# Patient Record
Sex: FEMALE | Race: WHITE | Employment: FULL TIME
[De-identification: names, ages, dates, MRNs, and addresses within clinical notes are randomized per-mention and may not be internally consistent; named-entity substitution may affect disease eponyms.]

---

## 2017-10-01 ENCOUNTER — HEALTH MAINTENANCE LETTER (OUTPATIENT)
Age: 27
End: 2017-10-01

## 2019-04-26 ENCOUNTER — OFFICE VISIT (OUTPATIENT)
Dept: OBGYN | Facility: CLINIC | Age: 29
End: 2019-04-26
Payer: COMMERCIAL

## 2019-04-26 VITALS
DIASTOLIC BLOOD PRESSURE: 80 MMHG | WEIGHT: 174.2 LBS | TEMPERATURE: 98 F | SYSTOLIC BLOOD PRESSURE: 123 MMHG | HEIGHT: 67 IN | OXYGEN SATURATION: 100 % | HEART RATE: 68 BPM | BODY MASS INDEX: 27.34 KG/M2

## 2019-04-26 DIAGNOSIS — Z30.011 ENCOUNTER FOR INITIAL PRESCRIPTION OF CONTRACEPTIVE PILLS: ICD-10-CM

## 2019-04-26 DIAGNOSIS — Z01.419 ENCOUNTER FOR GYNECOLOGICAL EXAMINATION WITHOUT ABNORMAL FINDING: Primary | ICD-10-CM

## 2019-04-26 PROCEDURE — 99385 PREV VISIT NEW AGE 18-39: CPT | Performed by: NURSE PRACTITIONER

## 2019-04-26 PROCEDURE — G0145 SCR C/V CYTO,THINLAYER,RESCR: HCPCS | Performed by: NURSE PRACTITIONER

## 2019-04-26 RX ORDER — NORGESTIMATE AND ETHINYL ESTRADIOL 0.25-0.035
1 KIT ORAL DAILY
Qty: 84 TABLET | Refills: 3 | Status: SHIPPED | OUTPATIENT
Start: 2019-04-26 | End: 2020-03-17

## 2019-04-26 ASSESSMENT — MIFFLIN-ST. JEOR: SCORE: 1557.8

## 2019-04-26 ASSESSMENT — PAIN SCALES - GENERAL: PAINLEVEL: NO PAIN (0)

## 2019-04-26 NOTE — PROGRESS NOTES
SUBJECTIVE:   CC: Amie Holt is an 28 year old woman who presents for preventive health visit.     Healthy Habits:     Getting at least 3 servings of Calcium per day:  Yes    Bi-annual eye exam:  Yes    Dental care twice a year:  NO    Sleep apnea or symptoms of sleep apnea:  None    Diet:  Regular (no restrictions)    Frequency of exercise:  4-5 days/week    Duration of exercise:  45-60 minutes    Taking medications regularly:  Yes    Medication side effects:  Not applicable    PHQ-2 Total Score: 0    Additional concerns today:  No      Would like to restart combined oral contraceptive pill.    Today's PHQ-2 Score:   PHQ-2 ( 1999 [employer]) 4/26/2019   Q1: Little interest or pleasure in doing things 0   Q2: Feeling down, depressed or hopeless 0   PHQ-2 Score 0   Q1: Little interest or pleasure in doing things Not at all   Q2: Feeling down, depressed or hopeless Not at all   PHQ-2 Score 0       Abuse: Current or Past(Physical, Sexual or Emotional)- No  Do you feel safe in your environment? Yes    Social History     Tobacco Use     Smoking status: Never Smoker     Smokeless tobacco: Never Used   Substance Use Topics     Alcohol use: No         Alcohol Use 4/26/2019   Prescreen: >3 drinks/day or >7 drinks/week? No   No flowsheet data found.    Reviewed orders with patient.  Reviewed health maintenance and updated orders accordingly - Yes  There is no problem list on file for this patient.    Past Surgical History:   Procedure Laterality Date     MOUTH SURGERY      wisdom teeth extraction       Social History     Tobacco Use     Smoking status: Never Smoker     Smokeless tobacco: Never Used   Substance Use Topics     Alcohol use: No     Family History   Problem Relation Age of Onset     Allergies Mother      Blood Disease Mother         Von Willebrand disease; pt tested & negative     Cardiovascular Paternal Uncle            Mammogram not appropriate for this patient based on age.    Pertinent mammograms are  "reviewed under the imaging tab.  History of abnormal Pap smear: NO - age 21-29 PAP every 3 years recommended  PAP / HPV 4/18/2012   PAP NIL     Reviewed and updated as needed this visit by clinical staff  Tobacco  Allergies  Meds  Med Hx  Surg Hx  Fam Hx  Soc Hx        Reviewed and updated as needed this visit by Provider        Past Medical History:   Diagnosis Date     Allergic rhinitis       Past Surgical History:   Procedure Laterality Date     MOUTH SURGERY      wisdom teeth extraction       Review of Systems  CONSTITUTIONAL: NEGATIVE for fever, chills, change in weight  INTEGUMENTARU/SKIN: NEGATIVE for worrisome rashes, moles or lesions  EYES: NEGATIVE for vision changes or irritation  ENT: NEGATIVE for ear, mouth and throat problems  RESP: NEGATIVE for significant cough or SOB  BREAST: NEGATIVE for masses, tenderness or discharge  CV: NEGATIVE for chest pain, palpitations or peripheral edema  GI: NEGATIVE for nausea, abdominal pain, heartburn, or change in bowel habits  : NEGATIVE for unusual urinary or vaginal symptoms. Periods are regular.  MUSCULOSKELETAL: NEGATIVE for significant arthralgias or myalgia  NEURO: NEGATIVE for weakness, dizziness or paresthesias  PSYCHIATRIC: NEGATIVE for changes in mood or affect     OBJECTIVE:   /80 (BP Location: Right arm, Patient Position: Sitting, Cuff Size: Adult Regular)   Pulse 68   Temp 98  F (36.7  C) (Oral)   Ht 1.702 m (5' 7\")   Wt 79 kg (174 lb 3.2 oz)   LMP 04/20/2019 (Exact Date)   SpO2 100%   BMI 27.28 kg/m    Physical Exam  GENERAL: healthy, alert and no distress  EYES: Eyes grossly normal to inspection, PERRL and conjunctivae and sclerae normal  HENT: ear canals and TM's normal, nose and mouth without ulcers or lesions  NECK: no adenopathy, no asymmetry, masses, or scars and thyroid normal to palpation  RESP: lungs clear to auscultation - no rales, rhonchi or wheezes  BREAST: normal without masses, tenderness or nipple discharge and no " "palpable axillary masses or adenopathy  CV: regular rate and rhythm, normal S1 S2, no S3 or S4, no murmur, click or rub, no peripheral edema and peripheral pulses strong  ABDOMEN: soft, nontender, no hepatosplenomegaly, no masses and bowel sounds normal   (female): normal female external genitalia, normal urethral meatus, vaginal mucosa pink, moist, well rugated, and normal cervix/adnexa/uterus without masses or discharge  MS: no gross musculoskeletal defects noted, no edema  SKIN: no suspicious lesions or rashes  NEURO: Normal strength and tone, mentation intact and speech normal  PSYCH: mentation appears normal, affect normal/bright    ASSESSMENT/PLAN:   1. Encounter for gynecological examination without abnormal finding  - Pap imaged thin layer screen reflex to HPV if ASCUS - recommend age 25 - 29    2. Encounter for initial prescription of contraceptive pills  Discussed options for contraception with patient and she would like to try an oral contraceptive pill. We discussed when to start the pill, taking it at the same time every day, possible side effects she may experience, and use of barrier method to protect against STDs.   - norgestimate-ethinyl estradiol (SPRINTEC 28) 0.25-35 MG-MCG tablet; Take 1 tablet by mouth daily  Dispense: 84 tablet; Refill: 3    COUNSELING:  Reviewed preventive health counseling, as reflected in patient instructions  Special attention given to:        Regular exercise       Healthy diet/nutrition       Contraception       Safe sex practices/STD prevention       HIV screeninx in teen years, 1x in adult years, and at intervals if high risk    BP Readings from Last 1 Encounters:   19 123/80     Estimated body mass index is 27.28 kg/m  as calculated from the following:    Height as of this encounter: 1.702 m (5' 7\").    Weight as of this encounter: 79 kg (174 lb 3.2 oz).           reports that she has never smoked. She has never used smokeless tobacco.      Counseling " Resources:  ATP IV Guidelines  Pooled Cohorts Equation Calculator  Breast Cancer Risk Calculator  FRAX Risk Assessment  ICSI Preventive Guidelines  Dietary Guidelines for Americans, 2010  USDA's MyPlate  ASA Prophylaxis  Lung CA Screening    OLIVIER Aguero CNP  [facility]

## 2019-04-26 NOTE — LETTER
May 1, 2019      Amie Holt  [address] [address]    Dear ,      I am happy to inform you that your recent cervical cancer screening test (PAP smear) was normal.      Preventative screenings such as this help to ensure your health for years to come. You should repeat a pap smear in 3 years, unless otherwise directed.      You will still need to return to the clinic every year for your annual exam and other preventive tests.     If you have additional questions regarding this result, please call our registered nurse, Khushboo at [phone].      Sincerely,      OLIVIER Aguero CNP/rlm

## 2019-04-30 LAB
COPATH REPORT: NORMAL
PAP: NORMAL

## 2019-12-18 ENCOUNTER — E-VISIT (OUTPATIENT)
Dept: OBGYN | Facility: CLINIC | Age: 29
End: 2019-12-18
Payer: COMMERCIAL

## 2019-12-18 DIAGNOSIS — J01.80 ACUTE NON-RECURRENT SINUSITIS OF OTHER SINUS: Primary | ICD-10-CM

## 2019-12-18 PROCEDURE — 99444 ZZC PHYSICIAN ONLINE EVALUATION & MANAGEMENT SERVICE: CPT | Performed by: NURSE PRACTITIONER

## 2019-12-18 RX ORDER — AMOXICILLIN 875 MG
875 TABLET ORAL 2 TIMES DAILY
Qty: 14 TABLET | Refills: 0 | Status: SHIPPED | OUTPATIENT
Start: 2019-12-18 | End: 2021-03-26

## 2019-12-22 ENCOUNTER — OFFICE VISIT (OUTPATIENT)
Dept: URGENT CARE | Facility: URGENT CARE | Age: 29
End: 2019-12-22
Payer: COMMERCIAL

## 2019-12-22 VITALS
HEART RATE: 86 BPM | DIASTOLIC BLOOD PRESSURE: 73 MMHG | BODY MASS INDEX: 26.63 KG/M2 | SYSTOLIC BLOOD PRESSURE: 122 MMHG | TEMPERATURE: 98.6 F | WEIGHT: 170 LBS | OXYGEN SATURATION: 98 %

## 2019-12-22 DIAGNOSIS — H57.02 PUPIL ASYMMETRY: Primary | ICD-10-CM

## 2019-12-22 PROCEDURE — 99214 OFFICE O/P EST MOD 30 MIN: CPT | Performed by: NURSE PRACTITIONER

## 2019-12-22 NOTE — PROGRESS NOTES
SUBJECTIVE:  Chief Complaint:   Chief Complaint   Patient presents with     Eye Problem     left eye pain and swelling      History of Present Illness:  Amie Holt is a 29 year old female who presents complaining of moderate left eye pain, burning, redness for 1 week(s).   Onset/timing: sudden, worsening.    Contact wearer : No   Pupils different sizes, light sensitivity.   No recent head injury  Has had cold symptoms last week but this started before cold.   No injury to head or eye, no new products in eye.     Past Medical History:   Diagnosis Date     Allergic rhinitis      Current Outpatient Medications   Medication Sig Dispense Refill     norgestimate-ethinyl estradiol (SPRINTEC 28) 0.25-35 MG-MCG tablet Take 1 tablet by mouth daily 84 tablet 3     amoxicillin (AMOXIL) 875 MG tablet Take 1 tablet (875 mg) by mouth 2 times daily 14 tablet 0       No Known Allergies    Family History   Problem Relation Age of Onset     Allergies Mother      Blood Disease Mother         Von Willebrand disease; pt tested & negative     Cardiovascular Paternal Uncle        Review Of Systems  Skin: negative  Eyes: positive for left eye redness, different size pupils  Ears/Nose/Throat: negative  Respiratory: No shortness of breath, dyspnea on exertion, cough, or hemoptysis  Cardiovascular: negative  Gastrointestinal: negative  Genitourinary: negative  Musculoskeletal: negative  Neurologic: negative  Psychiatric: negative  Hematologic/Lymphatic/Immunologic: negative  Endocrine: negative      OBJECTIVE:  /73   Pulse 86   Temp 98.6  F (37  C) (Oral)   Wt 77.1 kg (170 lb)   SpO2 98%   Breastfeeding No   BMI 26.63 kg/m    General: no acute distress  Eye exam: right eye normal lid, conjunctiva, cornea, pupil and fundus, left eye abnormal findings: sclera injected. Left eye will react to light is smaller than right  Ears: normal canals, TMs bilaterally, normal TM mobility  Nose: NORMAL - no drainage, turbinates normal in  size.  Neck: supple, non-tender, free range of motion, no adenopathy  Heart: NORMAL - regular rate and rhythm without murmur.  Lungs: normal and clear to auscultation  Skin: No rash  ABDOMEN:  soft, nontender, no HSM or masses and bowel sounds normal  Extremities: no peripheral edema or tenderness, peripheral pulses normal  NEURO: Normal strength and tone, sensory exam grossly normal,  normal speech and mentation  SKIN: no suspicious lesions or rashes  PSYCH: mentation appears normal  LYMPHATICS: no cervical adenopathy        ASSESSMENT:  (H57.02) Pupil asymmetry  (primary encounter diagnosis)      PLAN:  Advised to be seen in ER for more urgent evaluation  I am concerned about pupils being different sizes  The eye does not appear to be abrasion, conjunctivitis?  Will be driven to [facility] now.       OLIVIER Mann CNP

## 2019-12-22 NOTE — NURSING NOTE
VISION   Wears glasses: NOT worn for testing  Tool used: HOTV   Right eye:        10/16 (20/32)   Left eye:          10/20 (20/40)  Both eyes: 10/16 (20/32)    Jolanta Lamb CMA

## 2019-12-26 ENCOUNTER — OFFICE VISIT (OUTPATIENT)
Dept: OPHTHALMOLOGY | Facility: CLINIC | Age: 29
End: 2019-12-26
Payer: COMMERCIAL

## 2019-12-26 DIAGNOSIS — H20.9 UVEITIS OF LEFT EYE: Primary | ICD-10-CM

## 2019-12-26 PROCEDURE — 92002 INTRM OPH EXAM NEW PATIENT: CPT | Performed by: OPHTHALMOLOGY

## 2019-12-26 RX ORDER — TOBRAMYCIN AND DEXAMETHASONE 3; 1 MG/ML; MG/ML
1 SUSPENSION/ DROPS OPHTHALMIC EVERY 6 HOURS
COMMUNITY
Start: 2019-12-25 | End: 2019-12-31

## 2019-12-26 RX ORDER — TOBRAMYCIN AND DEXAMETHASONE 3; 1 MG/ML; MG/ML
1 SUSPENSION/ DROPS OPHTHALMIC
Qty: 1 BOTTLE | Refills: 0 | Status: SHIPPED | OUTPATIENT
Start: 2019-12-26 | End: 2019-12-26

## 2019-12-26 RX ORDER — PREDNISOLONE ACETATE 10 MG/ML
1 SUSPENSION/ DROPS OPHTHALMIC
Qty: 1 BOTTLE | Refills: 1 | Status: SHIPPED | OUTPATIENT
Start: 2019-12-26 | End: 2021-03-26

## 2019-12-26 ASSESSMENT — SLIT LAMP EXAM - LIDS
COMMENTS: NORMAL
COMMENTS: 1+ EDEMA

## 2019-12-26 ASSESSMENT — VISUAL ACUITY
OD_CC: 20/20
CORRECTION_TYPE: GLASSES
METHOD: SNELLEN - LINEAR
OS_CC: 20/20

## 2019-12-26 ASSESSMENT — TONOMETRY
OD_IOP_MMHG: 14
IOP_METHOD: ICARE
OS_IOP_MMHG: 15

## 2019-12-26 ASSESSMENT — EXTERNAL EXAM - LEFT EYE: OS_EXAM: NORMAL

## 2019-12-26 ASSESSMENT — EXTERNAL EXAM - RIGHT EYE: OD_EXAM: NORMAL

## 2019-12-26 NOTE — PROGRESS NOTES
Current Eye Medications:  Started taking tobramycin-dexAMETHasone (TOBRADEX) 0.3%-0.1% yesterday evening every 6 hours left eye while a wake.  Was Taking Polytrim every 4 hours, started 12/22/19 didn't seem to help.      Subjective:  Referred from ER for possible Uveitis of left eye. Red about 1.5 weeks ago. Saturday night aching pain started left eye, would come and go. Started feeling better since starting Tobradex. Not having any pain or discomfort currently  Vision has been OK both eyes.    First episode; no known systemic disease.     Objective:  See Ophthalmology Exam.       Assessment:  Iritis left eye.      Plan:  Continue Tobradex drops left eye every 2 hours while awake until bottle is gone,  Then start Prednisolone left eye every 2 hours while awake.   Start Homatropine left eye twice daily.  Return visit in 1 week for MD check.     Rodney Elaine M.D.  [phone]

## 2019-12-26 NOTE — LETTER
12/26/2019         RE: Amie Holt  [address] [facility] [place] [address]        Dear Colleague,    Thank you for referring your patient, Amie Holt, to the [facility]. Please see a copy of my visit note below.     Current Eye Medications:  Started taking tobramycin-dexAMETHasone (TOBRADEX) 0.3%-0.1% yesterday evening every 6 hours left eye while a wake.  Was Taking Polytrim every 4 hours, started 12/22/19 didn't seem to help.      Subjective:  Referred from ER for possible Uveitis of left eye. Red about 1.5 weeks ago. Saturday night aching pain started left eye, would come and go. Started feeling better since starting Tobradex. Not having any pain or discomfort currently  Vision has been OK both eyes.    First episode; no known systemic disease.     Objective:  See Ophthalmology Exam.       Assessment:  Iritis left eye.      Plan:  Continue Tobradex drops left eye every 2 hours while awake until bottle is gone,  Then start Prednisolone left eye every 2 hours while awake.   Start Homatropine left eye twice daily.  Return visit in 1 week for MD check.     Rodney Elaine M.D.  [phone]           Again, thank you for allowing me to participate in the care of your patient.        Sincerely,        Rodney Elaine MD

## 2019-12-26 NOTE — PATIENT INSTRUCTIONS
Continue Tobradex drops left eye every 2 hours while awake until bottle is gone,  Then start Prednisolone left eye every 2 hours while awake.   Start Homatropine left eye twice daily.  Return visit in 1 week for MD check.     Rodney Elaine M.D.  [phone]

## 2019-12-31 ENCOUNTER — OFFICE VISIT (OUTPATIENT)
Dept: OPHTHALMOLOGY | Facility: CLINIC | Age: 29
End: 2019-12-31
Payer: COMMERCIAL

## 2019-12-31 DIAGNOSIS — H20.9 UVEITIS OF LEFT EYE: Primary | ICD-10-CM

## 2019-12-31 PROCEDURE — 92012 INTRM OPH EXAM EST PATIENT: CPT | Performed by: OPHTHALMOLOGY

## 2019-12-31 ASSESSMENT — TONOMETRY
OS_IOP_MMHG: 21
IOP_METHOD: ICARE
OD_IOP_MMHG: 20

## 2019-12-31 ASSESSMENT — VISUAL ACUITY
OD_CC: 20/20
METHOD: SNELLEN - LINEAR
CORRECTION_TYPE: GLASSES
OS_CC: 20/25SLOW

## 2019-12-31 ASSESSMENT — SLIT LAMP EXAM - LIDS
COMMENTS: NORMAL
COMMENTS: NORMAL

## 2019-12-31 ASSESSMENT — EXTERNAL EXAM - RIGHT EYE: OD_EXAM: NORMAL

## 2019-12-31 ASSESSMENT — EXTERNAL EXAM - LEFT EYE: OS_EXAM: NORMAL

## 2019-12-31 NOTE — PATIENT INSTRUCTIONS
Discontinue Tobradex drop - but hang onto bottle.  Start Prednisolone left eye four times daily for 1 week, three times daily for 1 week, twice daily for 1 week, once daily for 1 week, then stop.  Return visit if symptoms worsen or fail to improve.     Rodney Elaine M.D.  [phone]

## 2019-12-31 NOTE — LETTER
12/31/2019         RE: Amie Holt  [address] [facility] [address]        Dear Colleague,    Thank you for referring your patient, Amie Holt, to the [facility]. Please see a copy of my visit note below.     Current Eye Medications:  Tobradex every 2 hours left eye. Didn't have homatropine when patient tried to .  prednisolone, but hasn't started taking.     Subjective:  1 week recheck. Vision is doing well both eyes. No eye pain or discomfort in either eye. Eyes are feeling better now.     Objective:  See Ophthalmology Exam.       Assessment:  Iritis left eye improving.      Plan:  Discontinue Tobradex drop - but hang onto bottle.  Start Prednisolone left eye four times daily for 1 week, three times daily for 1 week, twice daily for 1 week, once daily for 1 week, then stop.  Return visit if symptoms worsen or fail to improve.     Rodney Elaine M.D.  [phone]             Again, thank you for allowing me to participate in the care of your patient.        Sincerely,        Rodney Elaine MD

## 2019-12-31 NOTE — PROGRESS NOTES
Current Eye Medications:  Tobradex every 2 hours left eye. Didn't have homatropine when patient tried to .  prednisolone, but hasn't started taking.     Subjective:  1 week recheck. Vision is doing well both eyes. No eye pain or discomfort in either eye. Eyes are feeling better now.     Objective:  See Ophthalmology Exam.       Assessment:  Iritis left eye improving.      Plan:  Discontinue Tobradex drop - but hang onto bottle.  Start Prednisolone left eye four times daily for 1 week, three times daily for 1 week, twice daily for 1 week, once daily for 1 week, then stop.  Return visit if symptoms worsen or fail to improve.     Rodney Elaine M.D.  [phone]

## 2020-02-23 ENCOUNTER — HEALTH MAINTENANCE LETTER (OUTPATIENT)
Age: 30
End: 2020-02-23

## 2020-03-17 DIAGNOSIS — Z30.011 ENCOUNTER FOR INITIAL PRESCRIPTION OF CONTRACEPTIVE PILLS: ICD-10-CM

## 2020-03-17 RX ORDER — NORGESTIMATE AND ETHINYL ESTRADIOL 0.25-0.035
KIT ORAL
Qty: 84 TABLET | Refills: 3 | Status: SHIPPED | OUTPATIENT
Start: 2020-03-17 | End: 2021-03-26

## 2020-09-03 ENCOUNTER — HOSPITAL ENCOUNTER (EMERGENCY)
Facility: CLINIC | Age: 30
Discharge: HOME | End: 2020-09-03
Attending: FAMILY MEDICINE | Admitting: FAMILY MEDICINE
Payer: COMMERCIAL

## 2020-09-03 VITALS
HEART RATE: 92 BPM | HEIGHT: 67 IN | DIASTOLIC BLOOD PRESSURE: 75 MMHG | RESPIRATION RATE: 16 BRPM | SYSTOLIC BLOOD PRESSURE: 109 MMHG | WEIGHT: 150 LBS | BODY MASS INDEX: 23.54 KG/M2 | TEMPERATURE: 96.7 F | OXYGEN SATURATION: 100 %

## 2020-09-03 DIAGNOSIS — R06.4 HYPERVENTILATION: ICD-10-CM

## 2020-09-03 LAB — GLUCOSE BLDC GLUCOMTR-MCNC: 163 MG/DL (ref 70–99)

## 2020-09-03 PROCEDURE — 99284 EMERGENCY DEPT VISIT MOD MDM: CPT | Performed by: FAMILY MEDICINE

## 2020-09-03 PROCEDURE — 99284 EMERGENCY DEPT VISIT MOD MDM: CPT | Mod: 25 | Performed by: FAMILY MEDICINE

## 2020-09-03 PROCEDURE — 93010 ELECTROCARDIOGRAM REPORT: CPT | Mod: Z6 | Performed by: FAMILY MEDICINE

## 2020-09-03 PROCEDURE — 93005 ELECTROCARDIOGRAM TRACING: CPT | Performed by: FAMILY MEDICINE

## 2020-09-03 PROCEDURE — 00000146 ZZHCL STATISTIC GLUCOSE BY METER IP

## 2020-09-03 ASSESSMENT — ENCOUNTER SYMPTOMS
FEVER: 0
HEADACHES: 0
DIAPHORESIS: 0
ABDOMINAL PAIN: 0
DYSURIA: 0
FREQUENCY: 0
COUGH: 0
CHILLS: 0
CONSTIPATION: 0
SHORTNESS OF BREATH: 0
DIARRHEA: 0
BLOOD IN STOOL: 0
NAUSEA: 1
VOMITING: 0
SORE THROAT: 0
PALPITATIONS: 0
WHEEZING: 0
SINUS PRESSURE: 0

## 2020-09-03 ASSESSMENT — MIFFLIN-ST. JEOR: SCORE: 1443.03

## 2020-09-03 NOTE — ED PROVIDER NOTES
History     Chief Complaint   Patient presents with     Loss of Consciousness     sitting in passenger seat; tunnel vision; LOC for about 5 sec. pt anxious because her SO is here and has been attacked by a dog     HPI  Amie Holt is a 29 year old female who presents with an episode of syncope that occurred while she was the passenger in a vehicle coming to this facility to be with her boyfriend who was bit by a dog today at work.  She is quite concerned about him and felt herself hyperventilating followed by an episode where she seemed to pop her head for 1 to 2 minutes and then appeared to be fully lucid alert without postictal..  She with the onset was experiencing lip and finger paresthesias.  She experienced no chest pain shortness of breath or palpitations.  There was nausea without vomiting.  No associated neurologic changes no weakness.  No changes in speech or swallowing. she felt short of breath,    No prior such episodes in the past.  Denies tobacco alcohol or drug use other than occasional marijuana..  On oral contraceptives and last menstrual period was in the last 2 to 3 weeks.  Denies pregnancy.    She had not eaten breakfast this morning.  Allergies:  No Known Allergies    Problem List:    There are no active problems to display for this patient.       Past Medical History:    Past Medical History:   Diagnosis Date     Allergic rhinitis        Past Surgical History:    Past Surgical History:   Procedure Laterality Date     MOUTH SURGERY      wisdom teeth extraction       Family History:    Family History   Problem Relation Age of Onset     Allergies Mother      Blood Disease Mother         Von Willebrand disease; pt tested & negative     Cardiovascular Paternal Uncle        Social History:  Marital Status:  Single [1]  Social History     Tobacco Use     Smoking status: Never Smoker     Smokeless tobacco: Never Used   Substance Use Topics     Alcohol use: No     Drug use: No        Medications:  "   amoxicillin (AMOXIL) 875 MG tablet  homatropine 5 % ophthalmic solution  prednisoLONE acetate (PRED FORTE) 1 % ophthalmic suspension  SPRINTEC 28 0.25-35 MG-MCG tablet          Review of Systems   Constitutional: Negative for chills, diaphoresis and fever.   HENT: Negative for ear pain, sinus pressure and sore throat.    Eyes: Negative for visual disturbance.   Respiratory: Negative for cough, shortness of breath and wheezing.    Cardiovascular: Negative for chest pain and palpitations.   Gastrointestinal: Positive for nausea. Negative for abdominal pain, blood in stool, constipation, diarrhea and vomiting.   Genitourinary: Negative for dysuria, frequency and urgency.   Skin: Negative for rash.   Neurological: Positive for syncope. Negative for headaches.   All other systems reviewed and are negative.      Physical Exam   BP: 110/73  Pulse: 85  Temp: 96.7  F (35.9  C)  Resp: 16  Height: 170.2 cm (5' 7\")  Weight: 68 kg (150 lb)  SpO2: 100 %      Physical Exam  Constitutional:       General: She is in acute distress.   HENT:      Mouth/Throat:      Pharynx: Oropharynx is clear.   Eyes:      Conjunctiva/sclera: Conjunctivae normal.   Neck:      Musculoskeletal: Neck supple.   Cardiovascular:      Rate and Rhythm: Normal rate and regular rhythm.      Pulses: Normal pulses.      Heart sounds: Normal heart sounds. No murmur.   Pulmonary:      Effort: Pulmonary effort is normal. No respiratory distress.      Breath sounds: Normal breath sounds. No stridor. No wheezing or rhonchi.   Abdominal:      General: Abdomen is flat. Bowel sounds are normal. There is no distension.      Tenderness: There is no abdominal tenderness. There is no guarding.   Musculoskeletal:      Right lower leg: No edema.      Left lower leg: No edema.   Skin:     Coloration: Skin is not pale.      Findings: No rash.   Neurological:      General: No focal deficit present.      Mental Status: She is alert and oriented to person, place, and time.      " Cranial Nerves: No cranial nerve deficit.      Sensory: No sensory deficit.      Motor: No weakness.         ED Course        Procedures                 EKG Interpretation:      Interpreted by Juan Carlos Strickland MD  EKG done at 1331 hrs. demonstrates a sinus rhythm at 92 bpm with a normal axis.  No ST change.  No T wave changes.  Normal R progression and no Q waves.  Normal intervals.  Normal conduction.  No ectopy.  Impression sinus rhythm 92 bpm      Critical Care time:  none               No results found for this or any previous visit (from the past 24 hour(s)).    Medications - No data to display    Assessments & Plan (with Medical Decision Making)     MDM: Amie Holt is a 29 year old female who presents with episode of what is likely hyperventilation when she was worried about her boyfriend and she was Kaleb in a vehicle coming here.  Had a syncopal episode that was brief and likely hyperventilation induced.  No significant findings on evaluation.  No red flags.  No cardiopulmonary symptoms with this other than the expected sense of dyspnea and paresthesias hands and lips.  We discussed the elevated blood sugar that was 163 likely stress response in addition to she had just eaten [employer]lsen[employer] and had a sugar soda that was large.  I did recommend at some point rechecking blood sugar fasting to confirm a normal blood sugar.  Other precautions as below.    I have reviewed the nursing notes.    I have reviewed the findings, diagnosis, plan and need for follow up with the patient.       New Prescriptions    No medications on file       Final diagnoses:   Hyperventilation/Panic - see attached measures. consider Bourne, anxiety and phobia workbook.  return for recurrent episodes, chest pain, shortness of breath, palpitations.       9/3/2020   [facility] EMERGENCY DEPARTMENT     Juan Carlos Strickland MD  09/03/20 [id number]

## 2020-09-03 NOTE — DISCHARGE INSTRUCTIONS
ICD-10-CM    1. Hyperventilation/Panic  R06.4     see attached measures. consider Bourne, anxiety and phobia workbook.  return for recurrent episodes, chest pain, shortness of breath, palpitations.

## 2020-09-03 NOTE — ED NOTES
"Pt has no complaints in room 8.  No cp, soa, recent illness, or fevers.  Pt states \"I feel fine.\"  "

## 2020-09-03 NOTE — ED AVS SNAPSHOT
[facility] Emergency Department  [address] [facility] [address]  Phone:  [phone]  Fax:  [phone]                                    Amie Holt   MRN: [id number]    Department:  [facility] Emergency Department   Date of Visit:  9/3/2020           After Visit Summary Signature Page    I have received my discharge instructions, and my questions have been answered. I have discussed any challenges I see with this plan with the nurse or doctor.    ..........................................................................................................................................  Patient/Patient Representative Signature      ..........................................................................................................................................  Patient Representative Print Name and Relationship to Patient    ..................................................               ................................................  Date                                   Time    ..........................................................................................................................................  Reviewed by Signature/Title    ...................................................              ..............................................  Date                                               Time          22EPIC Rev 08/18

## 2020-09-03 NOTE — ED TRIAGE NOTES
sitting in passenger seat; tunnel vision; LOC for about 5 sec. pt anxious because her SO is here and has been attacked by a dog

## 2020-12-13 ENCOUNTER — HEALTH MAINTENANCE LETTER (OUTPATIENT)
Age: 30
End: 2020-12-13

## 2021-03-24 ENCOUNTER — TRANSFERRED RECORDS (OUTPATIENT)
Dept: HEALTH INFORMATION MANAGEMENT | Facility: CLINIC | Age: 31
End: 2021-03-24

## 2021-03-24 ENCOUNTER — TELEPHONE (OUTPATIENT)
Dept: OBGYN | Facility: CLINIC | Age: 31
End: 2021-03-24

## 2021-03-24 RX ORDER — NORGESTIMATE AND ETHINYL ESTRADIOL 0.25-0.035
1 KIT ORAL DAILY
Qty: 84 TABLET | Refills: 3 | Status: CANCELLED | OUTPATIENT
Start: 2021-03-24

## 2021-03-24 NOTE — TELEPHONE ENCOUNTER
Pt is calling and states that she wants to talk to a nurse, she states for the last few hours she has been bleeding heavily  And going thru a tampon every half hour, please call to advise.     Lelia Rubio [place]  Staff

## 2021-03-24 NOTE — TELEPHONE ENCOUNTER
Pt is scheduled for a physical with OLIVIER Bourne CNP, on 3/26.    Spoke with pt.  She states her periods are pretty regular and she has never experienced this heavy of bleeding before.  She started spotting a few days ago, then yesterday had a bout of heavy bleeding then it slowed down and then started pretty heavy again today.  She states she has been soaking through 3 tampons every hour for the last 3 hours.    She denies pain or abdominal cramping, denies light headedness or dizziness.    Advised that pt be further evaluated in the ED now due to her heavy bleeding.  I also advised that pt keep her appt with Susanne on 3/26 as a follow up.    Pt verbalized understanding and agreed to plan.    Sandee Tellez RN

## 2021-03-26 ENCOUNTER — OFFICE VISIT (OUTPATIENT)
Dept: OBGYN | Facility: CLINIC | Age: 31
End: 2021-03-26
Payer: COMMERCIAL

## 2021-03-26 VITALS
OXYGEN SATURATION: 100 % | WEIGHT: 148 LBS | SYSTOLIC BLOOD PRESSURE: 133 MMHG | TEMPERATURE: 97.6 F | HEIGHT: 67 IN | HEART RATE: 106 BPM | BODY MASS INDEX: 23.23 KG/M2 | DIASTOLIC BLOOD PRESSURE: 80 MMHG

## 2021-03-26 DIAGNOSIS — O20.0 THREATENED ABORTION: Primary | ICD-10-CM

## 2021-03-26 LAB — B-HCG SERPL-ACNC: 1326 IU/L (ref 0–5)

## 2021-03-26 PROCEDURE — 36415 COLL VENOUS BLD VENIPUNCTURE: CPT | Performed by: NURSE PRACTITIONER

## 2021-03-26 PROCEDURE — 99212 OFFICE O/P EST SF 10 MIN: CPT | Performed by: NURSE PRACTITIONER

## 2021-03-26 PROCEDURE — 84702 CHORIONIC GONADOTROPIN TEST: CPT | Performed by: NURSE PRACTITIONER

## 2021-03-26 ASSESSMENT — MIFFLIN-ST. JEOR: SCORE: 1428.95

## 2021-03-26 ASSESSMENT — PAIN SCALES - GENERAL: PAINLEVEL: NO PAIN (0)

## 2021-03-26 NOTE — PROGRESS NOTES
SUBJECTIVE:   CC: Amie Holt is an 30 year old woman who presents for preventive health visit.     {Split Bill scripting  The purpose of this visit is to discuss your medical history and prevent health problems before you are sick. You may be responsible for a co-pay, coinsurance, or deductible if your visit today includes services such as checking on a sore throat, having an x-ray or lab test, or treating and evaluating a new or existing condition :317185}  Patient has been advised of split billing requirements and indicates understanding: {Yes and No:355075}  Healthy Habits:     Getting at least 3 servings of Calcium per day:  Yes    Bi-annual eye exam:  NO    Dental care twice a year:  Yes    Sleep apnea or symptoms of sleep apnea:  None    Diet:  Regular (no restrictions)    Frequency of exercise:  2-3 days/week    Duration of exercise:  30-45 minutes    Taking medications regularly:  Yes    Medication side effects:  None    PHQ-2 Total Score: 0    Additional concerns today:  Yes    {Add if <65 person on Medicare  - Required Questions (Optional):867604}  {Outside tests to abstract? :578733}    {additional problems to add (Optional):546381}    Today's PHQ-2 Score:   PHQ-2 ( 1999 [employer]) 3/26/2021   Q1: Little interest or pleasure in doing things 0   Q2: Feeling down, depressed or hopeless 0   PHQ-2 Score 0   Q1: Little interest or pleasure in doing things Not at all   Q2: Feeling down, depressed or hopeless Not at all   PHQ-2 Score 0       Abuse: Current or Past (Physical, Sexual or Emotional) - { :187506}  Do you feel safe in your environment? { :127272}    Have you ever done Advance Care Planning? (For example, a Health Directive, POLST, or a discussion with a medical provider or your loved ones about your wishes): { :121741}    Social History     Tobacco Use     Smoking status: Never Smoker     Smokeless tobacco: Never Used   Substance Use Topics     Alcohol use: No     {Rooming Staff- Complete  "this question if Prescreen response is not shown below for today's visit. If you drink alcohol do you typically have >3 drinks per day or >7 drinks per week? (Optional):009633}    Alcohol Use 3/26/2021   Prescreen: >3 drinks/day or >7 drinks/week? No   Prescreen: >3 drinks/day or >7 drinks/week? -   {add AUDIT responses (Optional) (A score of 7 for adult men is an indication of hazardous drinking; a score of 8 or more is an indication of an alcohol use disorder.  A score of 7 or more for adult women is an indication of hazardous drinking or an alchohol use disorder):587253}    Reviewed orders with patient.  Reviewed health maintenance and updated orders accordingly - { :401750::\"Yes\"}  {Chronicprobdata (optional):026181}    Breast Cancer Screening:  Any new diagnosis of family breast, ovarian, or bowel cancer? {Yes_Link to Screening / No:393335}    FSH-7: No flowsheet data found.  {If any of the questions to the BCRA (FHS-7) are answered yes, consider ordering referral for genetic counseling (Optional) :914975::\"click delete button to remove this line now\"}  {AMB Mammogram Decision Support (Optional) :241881}  Pertinent mammograms are reviewed under the imaging tab.    History of abnormal Pap smear: { :714041}  PAP / HPV 4/26/2019 4/18/2012   PAP NIL NIL     Reviewed and updated as needed this visit by clinical staff                 Reviewed and updated as needed this visit by Provider                {HISTORY OPTIONS (Optional):092260}    Review of Systems  {FEMALE ROS (Optional):331610}     OBJECTIVE:   There were no vitals taken for this visit.  Physical Exam  {Exam Choices (Optional):195819}    {Diagnostic Test Results (Optional):450783::\"Diagnostic Test Results:\",\"Labs reviewed in Epic\"}    ASSESSMENT/PLAN:   {Diag Picklist:558851}    Patient has been advised of split billing requirements and indicates understanding: {YES / NO:557251::\"Yes\"}  COUNSELING:  {FEMALE COUNSELING MESSAGES:337255::\"Reviewed preventive " "health counseling, as reflected in patient instructions\"}    Estimated body mass index is 23.49 kg/m  as calculated from the following:    Height as of 9/3/20: 1.702 m (5' 7\").    Weight as of 9/3/20: 68 kg (150 lb).    {Weight Management Plan (ACO) Complete if BMI is abnormal-  Ages 18-64  BMI >24.9.  Age 65+ with BMI <23 or >30 (Optional):863017}    She reports that she has never smoked. She has never used smokeless tobacco.      Counseling Resources:  ATP IV Guidelines  Pooled Cohorts Equation Calculator  Breast Cancer Risk Calculator  BRCA-Related Cancer Risk Assessment: FHS-7 Tool  FRAX Risk Assessment  ICSI Preventive Guidelines  Dietary Guidelines for Americans, 2010  USDA's MyPlate  ASA Prophylaxis  Lung CA Screening    OLIVIER Aguero CNP  [facility]  "

## 2021-03-26 NOTE — PROGRESS NOTES
Assessment & Plan     Threatened   Patient's ED work up reviewed and discussed. Discussed first trimester bleeding with patient. We reviewed possible causes and she is reassured that if this is miscarriage, there were nothing she did to cause this or could have done to prevent this. Discussed miscarriage rate, likely causes for first trimester miscarriage. Plan Quant HCG today and repeat again in a few days. If HCG levels increasing, plan ultrasound in 7-10 days to confirm viability. If HCG decreasing, reviewed monitoring weekly until back to normal. Warning signs to monitor for and report such as heavy vaginal bleeding, abdominal pain discussed and patient verbalizes understand. Advised on need for pelvic rest-especially avoiding tampon use and intercourse and she verbalizes understanding.  Patient is given an opportunity to ask questions and have them answered.   - HCG quantitative pregnancy; Standing  - HCG quantitative pregnancy    15 minutes spent on the date of the encounter doing chart review, history and exam, documentation and further activities as noted above    OLIVIER Aguero [facility] KEVIN Terrazas   Amie is a 30 year old who presents for the following health issues     HPI     ED Followup:    Facility:  [facility]  Date of visit: 2021  Reason for visit: Vaginal bleeding  Current Status: Light vaginal bleeding   Patient was initially scheduled for a preventive exam, however was seen in the ED 2 days ago and needs follow up. Will return to clinic in the next few weeks for routine care visit.    Patient presented to the ED 2 days ago due to heavy vaginal bleeding. Had been on oral contraceptive pill and recently discontinued as she was out of refills.  Was planning refill at appointment today. Had just had a positive pregnancy test. In the ED, work up showed she is A positive. Quant HCG was 4884. She had an ultrasound that did not show an  "intrauterine or extrauterine pregnancy. Presents today for follow up. Bleeding is light. No cramping or clotting. Denies dizziness, fatigue, pelvic pain, headache. Exam did show products in the cervical os. Pathology not available at this time.     Review of Systems   Constitutional, HEENT, cardiovascular, pulmonary, gi and gu systems are negative, except as otherwise noted.      Objective    /80 (BP Location: Right arm, Patient Position: Sitting, Cuff Size: Adult Regular)   Pulse 106   Temp 97.6  F (36.4  C) (Tympanic)   Ht 1.702 m (5' 7\")   Wt 67.1 kg (148 lb)   LMP 02/19/2021 (Exact Date)   SpO2 100%   BMI 23.18 kg/m    Body mass index is 23.18 kg/m .  Physical Exam   GENERAL: healthy, alert and no distress   (female): Not repeated  MS: no gross musculoskeletal defects noted, no edema  SKIN: no suspicious lesions or rashes  PSYCH: mentation appears normal, affect normal/bright        "

## 2021-03-30 DIAGNOSIS — O20.0 THREATENED ABORTION: ICD-10-CM

## 2021-03-30 LAB — B-HCG SERPL-ACNC: 384 IU/L (ref 0–5)

## 2021-03-30 PROCEDURE — 36415 COLL VENOUS BLD VENIPUNCTURE: CPT | Performed by: NURSE PRACTITIONER

## 2021-03-30 PROCEDURE — 84702 CHORIONIC GONADOTROPIN TEST: CPT | Performed by: NURSE PRACTITIONER

## 2021-04-10 DIAGNOSIS — O20.0 THREATENED ABORTION: ICD-10-CM

## 2021-04-10 PROCEDURE — 84702 CHORIONIC GONADOTROPIN TEST: CPT | Performed by: NURSE PRACTITIONER

## 2021-04-10 PROCEDURE — 36415 COLL VENOUS BLD VENIPUNCTURE: CPT | Performed by: NURSE PRACTITIONER

## 2021-04-12 LAB — B-HCG SERPL-ACNC: 15 IU/L (ref 0–5)

## 2021-04-18 DIAGNOSIS — O20.0 THREATENED ABORTION: ICD-10-CM

## 2021-04-18 PROCEDURE — 84702 CHORIONIC GONADOTROPIN TEST: CPT | Performed by: NURSE PRACTITIONER

## 2021-04-18 PROCEDURE — 36415 COLL VENOUS BLD VENIPUNCTURE: CPT | Performed by: NURSE PRACTITIONER

## 2021-04-19 LAB — B-HCG SERPL-ACNC: 2 IU/L (ref 0–5)

## 2021-09-26 ENCOUNTER — HEALTH MAINTENANCE LETTER (OUTPATIENT)
Age: 31
End: 2021-09-26

## 2022-01-16 ENCOUNTER — HEALTH MAINTENANCE LETTER (OUTPATIENT)
Age: 32
End: 2022-01-16

## 2023-01-08 ENCOUNTER — HEALTH MAINTENANCE LETTER (OUTPATIENT)
Age: 33
End: 2023-01-08

## 2023-04-23 ENCOUNTER — HEALTH MAINTENANCE LETTER (OUTPATIENT)
Age: 33
End: 2023-04-23